# Patient Record
Sex: MALE | Race: WHITE | URBAN - METROPOLITAN AREA
[De-identification: names, ages, dates, MRNs, and addresses within clinical notes are randomized per-mention and may not be internally consistent; named-entity substitution may affect disease eponyms.]

---

## 2022-03-19 PROBLEM — Z00.00 ANNUAL PHYSICAL EXAM: Status: ACTIVE | Noted: 2021-10-12

## 2022-12-28 ENCOUNTER — HOSPITAL ENCOUNTER (EMERGENCY)
Age: 37
Discharge: HOME | End: 2022-12-28

## 2022-12-28 VITALS
OXYGEN SATURATION: 98 % | DIASTOLIC BLOOD PRESSURE: 91 MMHG | WEIGHT: 175 LBS | HEIGHT: 70 IN | RESPIRATION RATE: 18 BRPM | SYSTOLIC BLOOD PRESSURE: 143 MMHG | TEMPERATURE: 98.1 F | HEART RATE: 72 BPM | BODY MASS INDEX: 25.05 KG/M2

## 2022-12-28 ASSESSMENT — PAIN - FUNCTIONAL ASSESSMENT: PAIN_FUNCTIONAL_ASSESSMENT: NONE - DENIES PAIN

## 2022-12-28 NOTE — ED NOTES
Called patient back to room but could not find him. Registration states they saw him exit and thought that he was  going to his car.      Zoe Guzman RN  12/28/22 [id number]

## 2022-12-28 NOTE — ED TRIAGE NOTES
Pt states he has had dizziness / \"swimmy headed\" since Friday, went to [facility] Saturday and was told he is dehydrated. States is worse when standing. Denies numbness or one sided weakness. No facial droop or slurred speech.

## 2022-12-29 ENCOUNTER — TELEMEDICINE (OUTPATIENT)
Dept: FAMILY MEDICINE CLINIC | Facility: CLINIC | Age: 37
End: 2022-12-29

## 2022-12-29 ENCOUNTER — TELEPHONE (OUTPATIENT)
Dept: FAMILY MEDICINE CLINIC | Facility: CLINIC | Age: 37
End: 2022-12-29

## 2022-12-29 DIAGNOSIS — J01.90 ACUTE SINUSITIS, RECURRENCE NOT SPECIFIED, UNSPECIFIED LOCATION: Primary | ICD-10-CM

## 2022-12-29 PROCEDURE — 99214 OFFICE O/P EST MOD 30 MIN: CPT | Performed by: FAMILY MEDICINE

## 2022-12-29 RX ORDER — PREDNISONE 10 MG/1
TABLET ORAL
Qty: 18 TABLET | Refills: 0 | Status: SHIPPED | OUTPATIENT
Start: 2022-12-29 | End: 2023-01-08

## 2022-12-29 RX ORDER — CEFDINIR 300 MG/1
300 CAPSULE ORAL 2 TIMES DAILY
Qty: 20 CAPSULE | Refills: 0 | Status: SHIPPED | OUTPATIENT
Start: 2022-12-29 | End: 2023-01-08

## 2022-12-29 SDOH — ECONOMIC STABILITY: FOOD INSECURITY: WITHIN THE PAST 12 MONTHS, YOU WORRIED THAT YOUR FOOD WOULD RUN OUT BEFORE YOU GOT MONEY TO BUY MORE.: NEVER TRUE

## 2022-12-29 SDOH — ECONOMIC STABILITY: FOOD INSECURITY: WITHIN THE PAST 12 MONTHS, THE FOOD YOU BOUGHT JUST DIDN'T LAST AND YOU DIDN'T HAVE MONEY TO GET MORE.: NEVER TRUE

## 2022-12-29 ASSESSMENT — SOCIAL DETERMINANTS OF HEALTH (SDOH): HOW HARD IS IT FOR YOU TO PAY FOR THE VERY BASICS LIKE FOOD, HOUSING, MEDICAL CARE, AND HEATING?: NOT HARD AT ALL

## 2022-12-29 NOTE — PROGRESS NOTES
[address]  Phone: [phone]  Fax: [phone]  Email: [email or web address]      Encounter Info  Hina Somers; Established patient 40 y.o.male; seen 12/29/2022 for: Sinus Problem (Sx- since Friday), Cough, Dizziness, and Tinnitus      Assessment & Plan    1. Acute sinusitis, recurrence not specified, unspecified location  -     predniSONE (DELTASONE) 10 MG tablet; 3 pills/day X 3 days, then 2 pills/day X 3 days, then 1 pill/day X 2 days, then 1/2 pill/day X 2 days, Disp-18 tablet, R-0Normal  -     cefdinir (OMNICEF) 300 MG capsule; Take 1 capsule by mouth 2 times daily for 10 days, Disp-20 capsule, R-0Normal      Check Out Instructions  Return in about 3 months (around 3/29/2023) for Physical.      Subjective & Objective    HPI  Pt c/o \"flu like Sx\" since last week (almost 2 wks duration) and is now c/o feeling episodes of lightheadedness & off balance, as well as sinus pressure/pain, ear fullness/pain, and ringing in his ears. Main Sx are sinus pressure & frontal HA. Pt is feeling lightheaded when he stands up or turns his head too quickly. Review of Systems     Physical Exam  No flowsheet data found. BP Readings from Last 3 Encounters:   12/28/22 (!) 143/91   10/12/21 112/70     Wt Readings from Last 3 Encounters:   12/28/22 175 lb (79.4 kg)   10/12/21 183 lb 3.2 oz (83.1 kg)     There is no height or weight on file to calculate BMI. PHQ-9  10/12/2021   Little interest or pleasure in doing things 0   Total Score PHQ 2 0        We discussed the typical prognosis and potential complications of the concern(s), including treatment options. Medication risks, benefits, costs, interactions, and alternatives were discussed as appropriate. I advised him to contact the office if his condition worsens or fails to improve as anticipated. He expressed understanding with the discussion and plan of care.     Hina Somers, was evaluated through a synchronous (real-time) audio and/or video encounter. The patient (or guardian if applicable) is aware that this is a billable service, which includes applicable co-pays. This Virtual Visit was conducted with patient's (and/or legal guardian's) consent. The visit was conducted pursuant to the emergency declaration under the [address] [place] [address], [address] [place] [address] authority and the [employer] General Act. Patient identification was verified, and a caregiver was present when appropriate. The patient was located at Home: [address] [place] [address]. Provider was located at [place] [address] (Appt Dept): [id number] [place] [id number]. An electronic signature was used to authenticate this note.   -- Raghu Oviedo MD

## 2022-12-29 NOTE — TELEPHONE ENCOUNTER
Pt called into nurse triage regarding: light headedness. Flu like symptoms. Has head cold. Sinus issues. Right ear is ringing. ECC made you an appointment.

## 2023-01-16 ENCOUNTER — TELEPHONE (OUTPATIENT)
Dept: FAMILY MEDICINE CLINIC | Facility: CLINIC | Age: 38
End: 2023-01-16

## 2023-01-16 NOTE — TELEPHONE ENCOUNTER
Would you like a vv schedule for f/u sinus pressure and ringing in the ear for this pt ? Please advise tks      ----- Message from Cass Sutton sent at 1/13/2023 11:33 AM EST -----  Subject: Message to Provider    QUESTIONS  Information for Provider? Pt seen on 12/29/2022 for sinus pressure and   ringing in ear, prescribed 2 medications however, pt still has ringing ear   and pressure. Requesting medication be called into [facility] Phone   [phone]/fax [phone]  ---------------------------------------------------------------------------  --------------  CALL BACK INFO  [phone]; Do not leave any message, patient will call back for answer  ---------------------------------------------------------------------------  --------------  SCRIPT ANSWERS  Relationship to Patient? Self

## 2023-01-16 NOTE — TELEPHONE ENCOUNTER
Pt is overdue for a CPX and does not appear that he scheduled it after his last visit with me virtually in December. .. Please advise pt to schedule an in office CPX & we can re-evaluate his sinus Sx at that time as well.

## 2023-02-10 ENCOUNTER — OFFICE VISIT (OUTPATIENT)
Dept: FAMILY MEDICINE CLINIC | Facility: CLINIC | Age: 38
End: 2023-02-10
Payer: COMMERCIAL

## 2023-02-10 VITALS
SYSTOLIC BLOOD PRESSURE: 128 MMHG | DIASTOLIC BLOOD PRESSURE: 77 MMHG | TEMPERATURE: 97.7 F | HEART RATE: 77 BPM | WEIGHT: 184.2 LBS | HEIGHT: 70 IN | BODY MASS INDEX: 26.37 KG/M2

## 2023-02-10 DIAGNOSIS — H93.13 TINNITUS OF BOTH EARS: ICD-10-CM

## 2023-02-10 DIAGNOSIS — H65.93 FLUID LEVEL BEHIND TYMPANIC MEMBRANE OF BOTH EARS: Primary | ICD-10-CM

## 2023-02-10 PROBLEM — H65.90 MEE (MIDDLE EAR EFFUSION): Status: ACTIVE | Noted: 2023-02-10

## 2023-02-10 PROCEDURE — 99214 OFFICE O/P EST MOD 30 MIN: CPT | Performed by: FAMILY MEDICINE

## 2023-02-10 RX ORDER — MONTELUKAST SODIUM 10 MG/1
10 TABLET ORAL NIGHTLY
Qty: 30 TABLET | Refills: 1 | Status: SHIPPED | OUTPATIENT
Start: 2023-02-10

## 2023-02-10 RX ORDER — PREDNISONE 10 MG/1
TABLET ORAL
Qty: 18 TABLET | Refills: 0 | Status: SHIPPED | OUTPATIENT
Start: 2023-02-10 | End: 2023-02-20

## 2023-02-10 RX ORDER — PSEUDOEPHEDRINE HCL 120 MG/1
120 TABLET, FILM COATED, EXTENDED RELEASE ORAL EVERY 12 HOURS
Qty: 20 TABLET | Refills: 0 | Status: SHIPPED | OUTPATIENT
Start: 2023-02-10 | End: 2023-02-20

## 2023-02-10 RX ORDER — FLUTICASONE PROPIONATE 50 MCG
1-2 SPRAY, SUSPENSION (ML) NASAL 2 TIMES DAILY
Qty: 1 EACH | Refills: 1 | Status: SHIPPED | OUTPATIENT
Start: 2023-02-10

## 2023-02-10 RX ORDER — CETIRIZINE HYDROCHLORIDE 10 MG/1
10 TABLET ORAL DAILY
Qty: 30 TABLET | Refills: 1 | Status: SHIPPED | OUTPATIENT
Start: 2023-02-10

## 2023-02-10 SDOH — ECONOMIC STABILITY: HOUSING INSECURITY
IN THE LAST 12 MONTHS, WAS THERE A TIME WHEN YOU DID NOT HAVE A STEADY PLACE TO SLEEP OR SLEPT IN A SHELTER (INCLUDING NOW)?: NO

## 2023-02-10 SDOH — ECONOMIC STABILITY: FOOD INSECURITY: WITHIN THE PAST 12 MONTHS, YOU WORRIED THAT YOUR FOOD WOULD RUN OUT BEFORE YOU GOT MONEY TO BUY MORE.: NEVER TRUE

## 2023-02-10 SDOH — ECONOMIC STABILITY: INCOME INSECURITY: HOW HARD IS IT FOR YOU TO PAY FOR THE VERY BASICS LIKE FOOD, HOUSING, MEDICAL CARE, AND HEATING?: NOT HARD AT ALL

## 2023-02-10 SDOH — ECONOMIC STABILITY: FOOD INSECURITY: WITHIN THE PAST 12 MONTHS, THE FOOD YOU BOUGHT JUST DIDN'T LAST AND YOU DIDN'T HAVE MONEY TO GET MORE.: NEVER TRUE

## 2023-02-10 NOTE — ASSESSMENT & PLAN NOTE
Problem and/or Symptoms are currently not stable and/or well controlled on current treatment plan. Will have patient follow up as directed and make the following changes for further evaluation and/or treatment: For this & tinnitus, will Tx with low dose steroid taper along with AR meds Zyrtec, Singulair, Flonase, & short course of Sudafed. Pt has f/u scheduled next week so will discuss response then.

## 2023-02-10 NOTE — PROGRESS NOTES
[place] [address]  Phone: [phone]  Fax: [phone]  Email: [email or web address]      Encounter Info  Kyle Hartmann; Established patient 37 y.o.male; seen 2/10/2023 for: Ear Drainage (Woke up blood on his pillolw--> right ear ) and Tinnitus (Ring on going)      Assessment & Plan    1. Fluid level behind tympanic membrane of both ears  Assessment & Plan:  Problem and/or Symptoms are currently not stable and/or well controlled on current treatment plan. Will have patient follow up as directed and make the following changes for further evaluation and/or treatment:     For this & tinnitus, will Tx with low dose steroid taper along with AR meds Zyrtec, Singulair, Flonase, & short course of Sudafed. Pt has f/u scheduled next week so will discuss response then.   Orders:  -     montelukast (SINGULAIR) 10 MG tablet; Take 1 tablet by mouth nightly, Disp-30 tablet, R-1Normal  -     cetirizine (ZYRTEC) 10 MG tablet; Take 1 tablet by mouth daily, Disp-30 tablet, R-1Normal  -     fluticasone (FLONASE) 50 MCG/ACT nasal spray; 1-2 sprays by Each Nostril route 2 times daily, Disp-1 each, R-1Normal  -     pseudoephedrine (SUDAFED 12 HR) 120 MG extended release tablet; Take 1 tablet by mouth in the morning and 1 tablet in the evening. Do all this for 10 days., Disp-20 tablet, R-0Normal  -     predniSONE (DELTASONE) 10 MG tablet; 3 pills/day X 3 days, then 2 pills/day X 3 days, then 1 pill/day X 2 days, then 1/2 pill/day X 2 days, Disp-18 tablet, R-0Normal  2. Tinnitus of both ears  -     montelukast (SINGULAIR) 10 MG tablet; Take 1 tablet by mouth nightly, Disp-30 tablet, R-1Normal  -     cetirizine (ZYRTEC) 10 MG tablet; Take 1 tablet by mouth daily, Disp-30 tablet, R-1Normal  -     fluticasone (FLONASE) 50 MCG/ACT nasal spray; 1-2 sprays by Each Nostril route 2 times daily, Disp-1 each, R-1Normal  -     pseudoephedrine (SUDAFED 12 HR) 120 MG extended release tablet; Take 1  tablet by mouth in the morning and 1 tablet in the evening. Do all this for 10 days. , Disp-20 tablet, R-0Normal  -     predniSONE (DELTASONE) 10 MG tablet; 3 pills/day X 3 days, then 2 pills/day X 3 days, then 1 pill/day X 2 days, then 1/2 pill/day X 2 days, Disp-18 tablet, R-0Normal        Check Out Instructions  Return for Next Scheduled. Subjective & Objective    HPI  Pt c/o B/L ear fullness & intermittent tinnitus, R>L side, for the past 4-5 wks. Was Tx'd for sinusitis at that time & his sinus Sx improved but has had the persistent ear issues ever since. Review of Systems    Physical Exam  HENT:      Right Ear: A middle ear effusion is present. Left Ear: A middle ear effusion is present. Nose: Congestion present. Mouth/Throat:      Pharynx: Pharyngeal swelling and posterior oropharyngeal erythema present. Vitals:    02/10/23 1112   BP: 128/77   Site: Left Upper Arm   Position: Sitting   Cuff Size: Medium Adult   Pulse: 77   Temp: 97.7 °F (36.5 °C)   Weight: 184 lb 3.2 oz (83.6 kg)   Height: 5' 10\" (1.778 m)     BP Readings from Last 3 Encounters:   02/10/23 128/77   12/28/22 (!) 143/91   10/12/21 112/70     Body mass index is 26.43 kg/m². Wt Readings from Last 3 Encounters:   02/10/23 184 lb 3.2 oz (83.6 kg)   12/28/22 175 lb (79.4 kg)   10/12/21 183 lb 3.2 oz (83.1 kg)       PHQ-9  10/12/2021   Little interest or pleasure in doing things 0   Total Score PHQ 2 0        We discussed the typical prognosis and potential complications of the concern(s), including treatment options. Medication risks, benefits, costs, interactions, and alternatives were discussed as appropriate. I advised him to contact the office if his condition worsens or fails to improve as anticipated. He expressed understanding with the discussion and plan of care. An electronic signature was used to authenticate this note.   -- Milagro Donis MD

## 2023-02-10 NOTE — PATIENT INSTRUCTIONS

## 2023-10-18 ENCOUNTER — APPOINTMENT (OUTPATIENT)
Dept: GENERAL RADIOLOGY | Age: 38
End: 2023-10-18
Payer: COMMERCIAL

## 2023-10-18 ENCOUNTER — HOSPITAL ENCOUNTER (EMERGENCY)
Age: 38
Discharge: HOME | End: 2023-10-18
Payer: COMMERCIAL

## 2023-10-18 VITALS
BODY MASS INDEX: 25.92 KG/M2 | DIASTOLIC BLOOD PRESSURE: 69 MMHG | WEIGHT: 175 LBS | SYSTOLIC BLOOD PRESSURE: 137 MMHG | OXYGEN SATURATION: 98 % | HEIGHT: 69 IN | TEMPERATURE: 98.8 F | RESPIRATION RATE: 17 BRPM | HEART RATE: 100 BPM

## 2023-10-18 DIAGNOSIS — J40 BRONCHITIS: Primary | ICD-10-CM

## 2023-10-18 PROBLEM — R05.1 ACUTE COUGH: Status: ACTIVE | Noted: 2023-10-18

## 2023-10-18 PROCEDURE — 99283 EMERGENCY DEPT VISIT LOW MDM: CPT

## 2023-10-18 PROCEDURE — 71045 X-RAY EXAM CHEST 1 VIEW: CPT

## 2023-10-18 RX ORDER — DOXYCYCLINE HYCLATE 100 MG
100 TABLET ORAL 2 TIMES DAILY
Qty: 14 TABLET | Refills: 0 | Status: SHIPPED | OUTPATIENT
Start: 2023-10-18 | End: 2023-10-25

## 2023-10-18 RX ORDER — BENZONATATE 200 MG/1
200 CAPSULE ORAL 3 TIMES DAILY PRN
Qty: 21 CAPSULE | Refills: 0 | Status: SHIPPED | OUTPATIENT
Start: 2023-10-18 | End: 2023-10-25

## 2023-10-18 RX ORDER — PREDNISONE 10 MG/1
10 TABLET ORAL DAILY
Qty: 7 TABLET | Refills: 0 | Status: SHIPPED | OUTPATIENT
Start: 2023-10-18 | End: 2023-10-25

## 2023-10-18 ASSESSMENT — PAIN - FUNCTIONAL ASSESSMENT: PAIN_FUNCTIONAL_ASSESSMENT: NONE - DENIES PAIN

## 2023-10-18 NOTE — ED TRIAGE NOTES
Pt ambulatory to triage. Pt reports cough for last 16 days, pt denies fever or sore throat. Pt states he has been taking OTC cold and flu medication.

## 2023-10-19 NOTE — ED PROVIDER NOTES
standard drinks of alcohol     Social Determinants of Health     Financial Resource Strain: Low Risk  (2/10/2023)    Overall Financial Resource Strain (CARDIA)     Difficulty of Paying Living Expenses: Not hard at all   Food Insecurity: No Food Insecurity (2/10/2023)    Hunger Vital Sign     Worried About Running Out of Food in the Last Year: Never true     [address] in the Last Year: Never true   Transportation Needs: Unknown (2/10/2023)    PRAPARE - Transportation     Lack of Transportation (Non-Medical): No   Housing Stability: Unknown (2/10/2023)    Housing Stability Vital Sign     Unstable Housing in the Last Year: No        Discharge Medication List as of 10/18/2023  4:05 PM        CONTINUE these medications which have NOT CHANGED    Details   montelukast (SINGULAIR) 10 MG tablet Take 1 tablet by mouth nightly, Disp-30 tablet, R-1Normal      cetirizine (ZYRTEC) 10 MG tablet Take 1 tablet by mouth daily, Disp-30 tablet, R-1Normal      fluticasone (FLONASE) 50 MCG/ACT nasal spray 1-2 sprays by Each Nostril route 2 times daily, Disp-1 each, R-1Normal              Results for orders placed or performed during the hospital encounter of 10/18/23   XR CHEST 1 VIEW    Narrative    CHEST X-RAY, single portable view  10/18/2023    History: Cough    Technique: Single frontal view of the chest.    Comparison: None    Findings: The cardiac silhouette is normal in respect to size. The lungs are expanded  without evidence for pneumothorax. No consolidative airspace process or  pleural effusion is seen. Impression    1. No acute cardiopulmonary process evident on single frontal view of the  chest.      This report was made using voice transcription. Despite my best efforts to avoid  any, transcription errors may persist. If there is any question about the  accuracy of the report or need for clarification, then please call [phone], or text me through [employer] for clarification or correction.